# Patient Record
Sex: MALE | Race: WHITE | NOT HISPANIC OR LATINO | Employment: STUDENT | URBAN - METROPOLITAN AREA
[De-identification: names, ages, dates, MRNs, and addresses within clinical notes are randomized per-mention and may not be internally consistent; named-entity substitution may affect disease eponyms.]

---
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
## 2023-09-18 ENCOUNTER — OFFICE VISIT (OUTPATIENT)
Dept: URGENT CARE | Facility: CLINIC | Age: 18
End: 2023-09-18
Payer: COMMERCIAL

## 2023-09-18 VITALS
TEMPERATURE: 98.1 F | WEIGHT: 305 LBS | HEART RATE: 69 BPM | RESPIRATION RATE: 12 BRPM | BODY MASS INDEX: 39.16 KG/M2 | OXYGEN SATURATION: 98 %

## 2023-09-18 DIAGNOSIS — M54.6 ACUTE MIDLINE THORACIC BACK PAIN: ICD-10-CM

## 2023-09-18 DIAGNOSIS — M54.2 NECK PAIN: Primary | ICD-10-CM

## 2023-09-18 PROCEDURE — 99213 OFFICE O/P EST LOW 20 MIN: CPT | Performed by: PHYSICIAN ASSISTANT

## 2023-09-18 RX ORDER — PREDNISONE 10 MG/1
30 TABLET ORAL DAILY
Qty: 12 TABLET | Refills: 0 | Status: SHIPPED | OUTPATIENT
Start: 2023-09-18 | End: 2023-09-22

## 2023-09-18 RX ORDER — NAPROXEN 500 MG/1
500 TABLET ORAL 2 TIMES DAILY WITH MEALS
Qty: 20 TABLET | Refills: 0 | Status: SHIPPED | OUTPATIENT
Start: 2023-09-18 | End: 2023-09-28

## 2023-09-18 NOTE — PROGRESS NOTES
[facility]        NAME: Edward Kelsey is a 19 y.o. male  : 2005    MRN: [id number]  DATE: 2023  TIME: 10:10 AM    Assessment and Plan   Neck pain [M54.2]  1. Neck pain  Ambulatory referral to Orthopedic Surgery    Ambulatory Referral to Rheumatology    predniSONE 10 mg tablet    naproxen (Naprosyn) 500 mg tablet      2. Acute midline thoracic back pain              Patient Instructions   Patient Instructions   Take the steroid and when completed begin the naproxen. Follow-up with orthopedics. Follow up with PCP in 3-5 days. Proceed to  ER if symptoms worsen. Chief Complaint     Chief Complaint   Patient presents with   • Spasms     Pt presents with right sided palm pain that radiates up right arm, knuckle stiffness on the right hand, started to worsen this morning; states right side back pain below scapula  started last week          History of Present Illness       BISI BAILEY Is a 55-year-old male presenting with pain in the right thumb, right wrist, lateral right forearm, cervical neck pain, and back pain x a few months. Patient says that the back pain and neck pain is relieved with a back massager and that the pain all over is worse with daily activity. Patient describes the pain as crampy and stiff. Patient says that the pain in the hand is on the dorsal aspect of the thumb and the knuckles, and is having difficulty with extension of the right wrist as well. Patient says his  strength is also weakened in the right hand when asked to do flexion of the fingers. Patient's family history is positive for rheumatoid arthritis and carpal tunnel. Patient reports being right-handed. He reports feeling decreased  strength in the right hand. Occasionally the right shoulder pain is described as a burning pain. He admits when using his right hand a lot he will feel paresthesias in the right thumb.   Admits that this got worse when he took on a bigger role at work.                Review of Systems   Review of Systems   Constitutional: Negative for activity change, appetite change, chills, diaphoresis and fever. HENT: Negative for congestion. Respiratory: Negative for cough, chest tightness and shortness of breath. Cardiovascular: Negative for chest pain and palpitations. Gastrointestinal: Negative for abdominal pain, diarrhea, nausea and vomiting. Musculoskeletal: Positive for arthralgias and back pain. Negative for joint swelling and myalgias. Skin: Negative for color change and pallor. Neurological: Negative for headaches.          Current Medications       Current Outpatient Medications:   •  albuterol (ProAir HFA) 90 mcg/act inhaler, Inhale 2 puffs every 4 (four) hours as needed for wheezing or shortness of breath, Disp: 8.5 g, Rfl: 9  •  FLUoxetine (PROzac) 20 mg capsule, , Disp: , Rfl:   •  naproxen (Naprosyn) 500 mg tablet, Take 1 tablet (500 mg total) by mouth 2 (two) times a day with meals for 10 days, Disp: 20 tablet, Rfl: 0  •  predniSONE 10 mg tablet, Take 3 tablets (30 mg total) by mouth daily for 4 days, Disp: 12 tablet, Rfl: 0  •  fluticasone (FLONASE) 50 mcg/act nasal spray, 1 spray into each nostril daily (Patient not taking: Reported on 9/18/2023), Disp: 18.2 mL, Rfl: 0  •  methylPREDNISolone 4 MG tablet therapy pack, Use as directed on package (Patient not taking: Reported on 9/18/2023), Disp: 21 tablet, Rfl: 0    Current Allergies     Allergies as of 09/18/2023 - Reviewed 09/18/2023   Allergen Reaction Noted   • Acetaminophen Other (See Comments) 04/28/2020            The following portions of the patient's history were reviewed and updated as appropriate: allergies, current medications, past family history, past medical history, past social history, past surgical history and problem list.     Past Medical History:   Diagnosis Date   • Allergic rhinitis    • Asthma    • Pneumonia    • Psychiatric disorder        Past Surgical History: Procedure Laterality Date   • ADENOIDECTOMY     • BONE TUMOR EXCISION Left     knee   • TONSILLECTOMY         Family History   Problem Relation Age of Onset   • Heart disease Mother    • Diabetes Mother          Medications have been verified. Objective   Pulse 69   Temp 98.1 °F (36.7 °C)   Resp 12   Wt (!) 138 kg (305 lb)   SpO2 98%   BMI 39.16 kg/m²        Physical Exam     Physical Exam  Vitals and nursing note reviewed. Constitutional:       General: He is not in acute distress. Appearance: Normal appearance. He is obese. He is not ill-appearing, toxic-appearing or diaphoretic. Cardiovascular:      Rate and Rhythm: Normal rate and regular rhythm. Heart sounds: No murmur heard. No friction rub. No gallop. Pulmonary:      Effort: Pulmonary effort is normal. No respiratory distress. Breath sounds: Normal breath sounds. No stridor. No wheezing or rhonchi. Musculoskeletal:         General: Tenderness (Tender to light palpation over the entire spine, both sides of the neck and entire right arm ) present. Normal range of motion. Comments: Lateral epicondylitis special test was performed and patient expressed pain with resistance to forced flexion while patient was in extension of the right hand. Phalen test was performed of the right hand and patient was unable to hold for 1 minute complaining of pain in the wrist of the right hand. Neck pain with ROM of the R shoulder    Skin:     General: Skin is warm.

## 2023-09-18 NOTE — LETTER
September 18, 2023     Patient: Sari Curry  YOB: 2005  Date of Visit: 9/18/2023      To Whom it May Concern:    Ramon Juarez is under my professional care. Jesi Johnson was seen in my office on 9/18/2023. Jesi Johnson . If you have any questions or concerns, please don't hesitate to call.          Sincerely,          Melissa Hodge PA-C        CC: No Recipients

## 2023-09-18 NOTE — LETTER
September 18, 2023     Patient: Davon Albert  YOB: 2005  Date of Visit: 9/18/2023      To Whom it May Concern:    Kiki Alexandra is under my professional care. Isaac Kahn was seen in my office on 9/18/2023. Isaac Kahn may return to work on 9/20/23 . If you have any questions or concerns, please don't hesitate to call.          Sincerely,          Nirali Desai PA-C        CC: No Recipients

## 2023-09-28 ENCOUNTER — OFFICE VISIT (OUTPATIENT)
Dept: OBGYN CLINIC | Facility: CLINIC | Age: 18
End: 2023-09-28
Payer: COMMERCIAL

## 2023-09-28 ENCOUNTER — APPOINTMENT (OUTPATIENT)
Dept: RADIOLOGY | Facility: CLINIC | Age: 18
End: 2023-09-28
Payer: COMMERCIAL

## 2023-09-28 VITALS
HEART RATE: 71 BPM | SYSTOLIC BLOOD PRESSURE: 140 MMHG | WEIGHT: 305 LBS | HEIGHT: 74 IN | BODY MASS INDEX: 39.14 KG/M2 | DIASTOLIC BLOOD PRESSURE: 84 MMHG

## 2023-09-28 DIAGNOSIS — M54.2 NECK PAIN ON RIGHT SIDE: Primary | ICD-10-CM

## 2023-09-28 DIAGNOSIS — G56.01 CARPAL TUNNEL SYNDROME ON RIGHT: ICD-10-CM

## 2023-09-28 DIAGNOSIS — M54.2 NECK PAIN: ICD-10-CM

## 2023-09-28 PROCEDURE — 99204 OFFICE O/P NEW MOD 45 MIN: CPT | Performed by: ORTHOPAEDIC SURGERY

## 2023-09-28 PROCEDURE — 72040 X-RAY EXAM NECK SPINE 2-3 VW: CPT

## 2023-09-28 NOTE — PROGRESS NOTES
Assessment/Plan:  1. Neck pain on right side  XR spine cervical 2 or 3 vw injury    Ambulatory referral to Physical Therapy      2. Carpal tunnel syndrome on right  Jose Sky has right-sided neck and shoulder pain which appears to be a trapezius muscle spasm. He may have an element of cervical radiculopathy but I do think muscle spasm in the cervical and trapezius region makes more sense. He appears to have numbness and tingling and weakness in the right hand which is more attributed to carpal tunnel syndrome. I recommended night splinting with a cock up wrist splint as well as physical therapy focusing on the neck. If the symptoms seem to be more related to cervical radiculopathy we could consider further imaging of the cervical spineor further work-up for carpal tunnel syndrome in the future. Follow-up in 6 weeks for repeat evaluation. Subjective:   Edward Kelsey is a 19 y.o. male who presents to the office for evaluation for right-sided neck pain rating into his right hand. He denies any recent injury or trauma. He has been having increased discomfort in the right side of his neck for the last few months. He does work and plays trumpet. He has been feeling increased pain in the neck and shoulder region and numbness into his hand at times. He does state that the numbness seems to increase at night and he wakes up with a numb feeling in his hand. He does report some weakness with  strength regarding the right hand as well. He went to urgent care recently and was placed on oral steroids and anti-inflammatory medications. He states this has not significantly helped. Review of Systems   Constitutional: Negative for chills, fever and unexpected weight change. HENT: Negative for hearing loss, nosebleeds and sore throat. Eyes: Negative for pain, redness and visual disturbance. Respiratory: Negative for cough, shortness of breath and wheezing.     Cardiovascular: Negative for chest pain, palpitations and leg swelling. Gastrointestinal: Negative for abdominal pain, nausea and vomiting. Endocrine: Negative for polyphagia and polyuria. Genitourinary: Negative for dysuria and hematuria. Musculoskeletal:        See HPI   Skin: Negative for rash and wound. Neurological: Negative for dizziness, numbness and headaches. Psychiatric/Behavioral: Negative for decreased concentration and suicidal ideas. The patient is not nervous/anxious.           Past Medical History:   Diagnosis Date   • Allergic rhinitis    • Asthma    • Pneumonia    • Psychiatric disorder        Past Surgical History:   Procedure Laterality Date   • ADENOIDECTOMY     • BONE TUMOR EXCISION Left     knee   • TONSILLECTOMY         Family History   Problem Relation Age of Onset   • Heart disease Mother    • Diabetes Mother        Social History     Occupational History   • Not on file   Tobacco Use   • Smoking status: Never     Passive exposure: Current   • Smokeless tobacco: Never   Vaping Use   • Vaping Use: Every day   • Substances: Nicotine, THC, CBD, Flavoring   Substance and Sexual Activity   • Alcohol use: Never   • Drug use: Yes     Types: Marijuana     Comment: states every other day   • Sexual activity: Not Currently         Current Outpatient Medications:   •  albuterol (ProAir HFA) 90 mcg/act inhaler, Inhale 2 puffs every 4 (four) hours as needed for wheezing or shortness of breath, Disp: 8.5 g, Rfl: 9  •  FLUoxetine (PROzac) 20 mg capsule, , Disp: , Rfl:   •  fluticasone (FLONASE) 50 mcg/act nasal spray, 1 spray into each nostril daily (Patient not taking: Reported on 9/18/2023), Disp: 18.2 mL, Rfl: 0  •  methylPREDNISolone 4 MG tablet therapy pack, Use as directed on package (Patient not taking: Reported on 9/18/2023), Disp: 21 tablet, Rfl: 0  •  naproxen (Naprosyn) 500 mg tablet, Take 1 tablet (500 mg total) by mouth 2 (two) times a day with meals for 10 days, Disp: 20 tablet, Rfl: 0    Allergies   Allergen Reactions   • Acetaminophen Other (See Comments)     Contraindicated due to fatty liver  Other reaction(s): Other (See Comments)  Contraindicated due to fatty liver  Contraindicated due to fatty liver       Objective:  Vitals:    09/28/23 1334   BP: 140/84   Pulse: 71            Right Hand Exam     Tenderness   Right hand tenderness location: Tenderness over carpal tunnel. Range of Motion   Wrist   Extension: normal   Flexion: normal   Pronation: normal   Supination: normal     Muscle Strength   Wrist extension: 5/5   Wrist flexion: 5/5   : 4/5     Tests   Phalen’s sign: positive  Tinel's sign (median nerve): positive    Other   Erythema: absent  Sensation: normal  Pulse: present    Comments:  Positive Durkan's test  Weakness with thumb opposition          Strength/Myotome Testing     Right Wrist/Hand   Wrist extension: 5  Wrist flexion: 5    Tests     Right Wrist/Hand   Positive Phalen's sign and Tinel's sign (medial nerve). Physical Exam  Vitals and nursing note reviewed. Constitutional:       Appearance: Normal appearance. He is well-developed. HENT:      Head: Normocephalic and atraumatic. Right Ear: External ear normal.      Left Ear: External ear normal.      Nose: Nose normal.   Eyes:      General: No scleral icterus. Extraocular Movements: Extraocular movements intact. Conjunctiva/sclera: Conjunctivae normal.   Cardiovascular:      Rate and Rhythm: Normal rate. Pulmonary:      Effort: Pulmonary effort is normal. No respiratory distress. Musculoskeletal:      Cervical back: Normal range of motion and neck supple. Comments: See Ortho exam   Skin:     General: Skin is warm and dry. Neurological:      General: No focal deficit present. Mental Status: He is alert and oriented to person, place, and time.    Psychiatric:         Behavior: Behavior normal.         I have personally reviewed pertinent films in PACS and my interpretation is as follows:  X-rays of the cervical spine demonstrate no acute abnormality. Loss of normal cervical lordosis indicative of muscle spasm. This document was created using speech voice recognition software. Grammatical errors, random word insertions, pronoun errors, and incomplete sentences are an occasional consequence of this system due to software limitations, ambient noise, and hardware issues. Any formal questions or concerns about content, text, or information contained within the body of this dictation should be directly addressed to the provider for clarification.

## 2023-10-30 ENCOUNTER — OFFICE VISIT (OUTPATIENT)
Dept: URGENT CARE | Facility: CLINIC | Age: 18
End: 2023-10-30
Payer: COMMERCIAL

## 2023-10-30 VITALS
HEART RATE: 78 BPM | BODY MASS INDEX: 40.17 KG/M2 | RESPIRATION RATE: 20 BRPM | OXYGEN SATURATION: 99 % | HEIGHT: 74 IN | TEMPERATURE: 97 F | WEIGHT: 313 LBS

## 2023-10-30 DIAGNOSIS — B34.9 VIRAL INFECTION: Primary | ICD-10-CM

## 2023-10-30 PROCEDURE — 87636 SARSCOV2 & INF A&B AMP PRB: CPT | Performed by: PHYSICIAN ASSISTANT

## 2023-10-30 PROCEDURE — 99203 OFFICE O/P NEW LOW 30 MIN: CPT | Performed by: PHYSICIAN ASSISTANT

## 2023-10-30 NOTE — PROGRESS NOTES
[facility]        NAME: Virginia Rangel is a 19 y.o. male  : 2005    MRN: [id number]  DATE: 2023  TIME: 9:19 AM    Assessment and Plan   Viral infection [B34.9]  1. Viral infection  Covid19 and INFLUENZA A/B PCR        Stay hydrated. May use pepto for symptomatic relief. Discussed strict return to care precautions as well as red flag symptoms which should prompt immediate ED referral. Pt verbalized understanding and is in agreement with plan. Please follow up with your primary care provider within the next week. Please remember that your visit today was with an urgent care provider and should not replace follow up with your primary care provider for chronic medical issues or annual physicals. (Directly from [facility] website)  IF YOU TEST POSITIVE FOR COVID-19:  If you have symptoms, you can end isolation after 5 full days if you are fever-free for 24 hours without the use of fever-reducing medication and your other symptoms have improved. Loss of taste and/or smell may persist for weeks or more and should not delay the end of isolation. Your first day of symptoms is DAY ZERO. You should continue to wear a well-fitting mask (like N95, [id number]) both at home and in public for 5 additional days. Avoid people who are at high risk for severe disease for at least 10 days. DO NOT go to places where you are unable to wear a mask until a full 10 days from your first symptom. If you have no symptoms, quarantine for 5 days from the day you were tested. The day you were tested is DAY ZERO. Continue wearing a mask around others until day 10. If you develop symptoms, your 5-day isolation period starts over. If you have/had severe symptoms and/or a compromised immune system, you may need to isolate longer. Consult with your primary care provider about when you can resume being around other people.     IF YOU HAVE HAD CLOSE EXPOSURE:  QUARANTINE IF: You are ages 25 or older and either have not been vaccinated, or have been vaccinated with your primary series but not the booster. You must quarantine for at least 5 days after your last contact. If you develop symptoms, get tested immediately. If you do not develop symptoms, get tested at least 5 days after your last exposure. Avoid people who are immunocompromised at high risk for severe disease until at least after 10 days. YOU DO NOT HAVE TO QUARANTINE IF:   You are 18 years or older and have received all recommended vaccine doses, including boosters and additional primary shots for some immunocompromised people. You are ages 6-13 and completed the primary series of COVID-19 vaccines. You had confirmed COVID-19 within the last 90 days on a viral test.  You should still wear a well-fitting mask around others for 10 days from the date of your last close exposure to COVID-19, and get tested at least 5 days later. Quarantine and isolation guidelines differ for healthcare professionals. Patient Instructions       Follow up with PCP in 3-5 days. Proceed to  ER if symptoms worsen. Chief Complaint     Chief Complaint   Patient presents with    URI      URI asthma attacks since Saturday         History of Present Illness       Renita Escobar is a(n) 19 y.o. male presenting with URI symptoms x 2 days  Past medical history: asthma, seasonal allergies  Congestion: yes  Sore throat: yes  Cough: yes  Sputum production: no  Fever: yes, subjective only  Body aches: yes  Loss of smell/taste: no  GI symptoms: yes diarrhea, nausea, and generalized abd cramping  Known sick contacts: yes, whole house has a "bug"  OTC meds tried: tylenol  Has been using his inhaler and does feel better today. Needs work note. Review of Systems   Review of Systems   Constitutional:         Negative except as noted in HPI   Respiratory:  Negative for shortness of breath. Cardiovascular:  Negative for chest pain.          Current Medications       Current Outpatient Medications:     albuterol (ProAir HFA) 90 mcg/act inhaler, Inhale 2 puffs every 4 (four) hours as needed for wheezing or shortness of breath, Disp: 8.5 g, Rfl: 9    FLUoxetine (PROzac) 20 mg capsule, , Disp: , Rfl:     fluticasone (FLONASE) 50 mcg/act nasal spray, 1 spray into each nostril daily (Patient not taking: Reported on 9/18/2023), Disp: 18.2 mL, Rfl: 0    methylPREDNISolone 4 MG tablet therapy pack, Use as directed on package (Patient not taking: Reported on 9/18/2023), Disp: 21 tablet, Rfl: 0    naproxen (Naprosyn) 500 mg tablet, Take 1 tablet (500 mg total) by mouth 2 (two) times a day with meals for 10 days, Disp: 20 tablet, Rfl: 0    Current Allergies     Allergies as of 10/30/2023 - Reviewed 10/30/2023   Allergen Reaction Noted    Acetaminophen Other (See Comments) 04/28/2020            The following portions of the patient's history were reviewed and updated as appropriate: allergies, current medications, past family history, past medical history, past social history, past surgical history and problem list.     Past Medical History:   Diagnosis Date    Allergic rhinitis     Asthma     Pneumonia     Psychiatric disorder        Past Surgical History:   Procedure Laterality Date    ADENOIDECTOMY      BONE TUMOR EXCISION Left     knee    TONSILLECTOMY         Family History   Problem Relation Age of Onset    Heart disease Mother     Diabetes Mother          Medications have been verified. Objective   Pulse 78   Temp (!) 97 °F (36.1 °C)   Resp 20   Ht 6' 2" (1.88 m)   Wt (!) 142 kg (313 lb)   SpO2 99%   BMI 40.19 kg/m²        Physical Exam     Physical Exam  Vitals and nursing note reviewed. Constitutional:       General: He is not in acute distress. Appearance: Normal appearance. He is not toxic-appearing. HENT:      Head: Normocephalic and atraumatic.       Right Ear: Tympanic membrane, ear canal and external ear normal.      Left Ear: Tympanic membrane, ear canal and external ear normal.      Nose: No congestion. Mouth/Throat:      Mouth: Mucous membranes are moist.      Pharynx: Oropharynx is clear. No oropharyngeal exudate or posterior oropharyngeal erythema. Eyes:      Conjunctiva/sclera: Conjunctivae normal.      Pupils: Pupils are equal, round, and reactive to light. Cardiovascular:      Rate and Rhythm: Normal rate and regular rhythm. Heart sounds: Normal heart sounds. Pulmonary:      Effort: Pulmonary effort is normal. No respiratory distress. Breath sounds: Normal breath sounds. No wheezing, rhonchi or rales. Abdominal:      General: Abdomen is flat. Bowel sounds are normal. There is no distension. Palpations: Abdomen is soft. Tenderness: There is generalized abdominal tenderness. There is no right CVA tenderness, left CVA tenderness, guarding or rebound. Negative signs include Colmenares's sign, Rovsing's sign and McBurney's sign. Musculoskeletal:      Cervical back: Normal range of motion and neck supple. Skin:     General: Skin is warm and dry. Capillary Refill: Capillary refill takes less than 2 seconds. Neurological:      Mental Status: He is alert and oriented to person, place, and time.    Psychiatric:         Behavior: Behavior normal.

## 2023-10-30 NOTE — LETTER
[facility] Imelda Morales  [place] [id number]  [phone]  Dept: [phone]    October 30, 2023    Patient: Lb Jordan  YOB: 2005    Lb Jordan was seen and evaluated at our [facility]. Please note if Covid and Flu tests are negative, they may return to work when fever free for 24 hours without the use of a fever reducing agent. If Covid or Flu test is positive, they may return to work on 11/2/2023, as this is 5 days from the onset of symptoms. Upon return, they must then adhere to strict masking for an additional 5 days.     Sincerely,    Milady Breaux PA-C

## 2023-10-31 LAB
FLUAV RNA RESP QL NAA+PROBE: NEGATIVE
FLUBV RNA RESP QL NAA+PROBE: NEGATIVE
SARS-COV-2 RNA RESP QL NAA+PROBE: NEGATIVE

## 2024-11-22 ENCOUNTER — OFFICE VISIT (OUTPATIENT)
Dept: URGENT CARE | Facility: CLINIC | Age: 19
End: 2024-11-22
Payer: COMMERCIAL

## 2024-11-22 VITALS
TEMPERATURE: 96.6 F | DIASTOLIC BLOOD PRESSURE: 85 MMHG | RESPIRATION RATE: 20 BRPM | OXYGEN SATURATION: 96 % | WEIGHT: 300 LBS | BODY MASS INDEX: 38.52 KG/M2 | HEART RATE: 90 BPM | SYSTOLIC BLOOD PRESSURE: 130 MMHG

## 2024-11-22 DIAGNOSIS — J02.9 SORE THROAT: ICD-10-CM

## 2024-11-22 DIAGNOSIS — J01.00 ACUTE NON-RECURRENT MAXILLARY SINUSITIS: Primary | ICD-10-CM

## 2024-11-22 LAB — S PYO AG THROAT QL: NEGATIVE

## 2024-11-22 PROCEDURE — 87880 STREP A ASSAY W/OPTIC: CPT

## 2024-11-22 PROCEDURE — 99213 OFFICE O/P EST LOW 20 MIN: CPT

## 2024-11-22 RX ORDER — PREDNISONE 20 MG/1
40 TABLET ORAL DAILY
Qty: 10 TABLET | Refills: 0 | Status: SHIPPED | OUTPATIENT
Start: 2024-11-22 | End: 2024-11-27

## 2024-11-22 RX ORDER — ALBUTEROL SULFATE 90 UG/1
2 INHALANT RESPIRATORY (INHALATION) EVERY 6 HOURS PRN
Qty: 8.5 G | Refills: 0 | Status: SHIPPED | OUTPATIENT
Start: 2024-11-22

## 2024-11-22 NOTE — PROGRESS NOTES
[facility]        NAME: Quique Kim is a 19 y.o. male  : 2005    MRN: [id number]  DATE: 2024  TIME: 2:11 PM    Assessment and Plan   Acute non-recurrent maxillary sinusitis [J01.00]  1. Acute non-recurrent maxillary sinusitis  amoxicillin-clavulanate (AUGMENTIN) 875-125 mg per tablet    predniSONE 20 mg tablet    albuterol (ProAir HFA) 90 mcg/act inhaler      2. Sore throat  POCT rapid ANTIGEN strepA        Rapid strep negative in office.     Symptoms likely due to sinusitis. Due to fever and severity of symptoms, will treat.     Patient Instructions     Supportive care with rest, adequate hydration, and warm liquids   Antibiotics as prescribed  Over the counter Tylenol/acetaminophen as needed for fever  Over the counter cold/cough medicine as needed    Follow up with PCP in 3-5 days   Go to ER if worsening symptoms      If tests are performed, our office will contact you with results only if changes need to made to the care plan discussed with you at the visit. [facility]    Chief Complaint     Chief Complaint   Patient presents with    Sore Throat     Reports sore throat, facial pain, mucus, headaches, lightheadedness, fatigue x 4 days. States he had a fever the first few days that resolved. Reports friend was recently unwell with similar symptoms. Using Mucinex-D that is helpful for congestion.          History of Present Illness       Friend recently sick with similar symptoms. Patient has history of asthma.    Sinusitis  This is a new problem. The current episode started in the past 7 days. The problem is unchanged. The maximum temperature recorded prior to his arrival was 100.4 - 100.9 F. The fever has been present for 1 to 2 days. The pain is moderate. Associated symptoms include congestion, coughing (productive), headaches, sinus pressure and a sore throat. Pertinent negatives include no chills or shortness of breath.  Treatments tried: Mucenex-D. The treatment provided moderate relief.       Review of Systems   Review of Systems   Constitutional:  Positive for fatigue and fever (now resolved). Negative for chills.   HENT:  Positive for congestion, postnasal drip, sinus pressure and sore throat. Negative for rhinorrhea and trouble swallowing.    Respiratory:  Positive for cough (productive) and chest tightness. Negative for shortness of breath and wheezing.    Cardiovascular:  Negative for chest pain and palpitations.   Gastrointestinal:  Negative for abdominal pain, nausea and vomiting.   Genitourinary:  Negative for difficulty urinating.   Musculoskeletal:  Negative for myalgias.   Neurological:  Positive for light-headedness and headaches. Negative for dizziness.         Current Medications       Current Outpatient Medications:     albuterol (ProAir HFA) 90 mcg/act inhaler, Inhale 2 puffs every 4 (four) hours as needed for wheezing or shortness of breath, Disp: 8.5 g, Rfl: 9    albuterol (ProAir HFA) 90 mcg/act inhaler, Inhale 2 puffs every 6 (six) hours as needed for wheezing, Disp: 8.5 g, Rfl: 0    amoxicillin-clavulanate (AUGMENTIN) 875-125 mg per tablet, Take 1 tablet by mouth every 12 (twelve) hours for 7 days, Disp: 14 tablet, Rfl: 0    fluticasone (FLONASE) 50 mcg/act nasal spray, 1 spray into each nostril daily, Disp: 18.2 mL, Rfl: 0    naproxen (Naprosyn) 500 mg tablet, Take 1 tablet (500 mg total) by mouth 2 (two) times a day with meals for 10 days (Patient taking differently: Take 1 tablet (500 mg total) by mouth 2 (two) times a day with meals for 10 days), Disp: 20 tablet, Rfl: 0    predniSONE 20 mg tablet, Take 2 tablets (40 mg total) by mouth daily for 5 days, Disp: 10 tablet, Rfl: 0    FLUoxetine (PROzac) 20 mg capsule, , Disp: , Rfl:     methylPREDNISolone 4 MG tablet therapy pack, Use as directed on package (Patient not taking: Reported on 11/22/2024), Disp: 21 tablet, Rfl: 0    Current Allergies     Allergies  as of 11/22/2024 - Reviewed 11/22/2024   Allergen Reaction Noted    Acetaminophen Other (See Comments) 04/28/2020            The following portions of the patient's history were reviewed and updated as appropriate: allergies, current medications, past family history, past medical history, past social history, past surgical history and problem list.     Past Medical History:   Diagnosis Date    Allergic rhinitis     Asthma     Pneumonia     Psychiatric disorder        Past Surgical History:   Procedure Laterality Date    ADENOIDECTOMY      BONE TUMOR EXCISION Left     knee    TONSILLECTOMY         Family History   Problem Relation Age of Onset    Heart disease Mother     Diabetes Mother          Medications have been verified.        Objective   /85   Pulse 90   Temp (!) 96.6 °F (35.9 °C) (Tympanic)   Resp 20   Wt 136 kg (300 lb)   SpO2 96%   BMI 38.52 kg/m²        Physical Exam     Physical Exam  Constitutional:       General: He is not in acute distress.     Appearance: He is ill-appearing.   HENT:      Nose: Congestion present.      Right Sinus: Maxillary sinus tenderness present. No frontal sinus tenderness.      Left Sinus: Maxillary sinus tenderness present. No frontal sinus tenderness.      Mouth/Throat:      Mouth: Mucous membranes are moist.      Pharynx: Oropharynx is clear. No posterior oropharyngeal erythema.      Tonsils: No tonsillar exudate. 1+ on the right. 1+ on the left.   Eyes:      Pupils: Pupils are equal, round, and reactive to light.   Cardiovascular:      Rate and Rhythm: Normal rate and regular rhythm.      Pulses: Normal pulses.      Heart sounds: Normal heart sounds. No murmur heard.     No gallop.   Pulmonary:      Effort: Pulmonary effort is normal. No respiratory distress.      Breath sounds: Normal breath sounds. No wheezing, rhonchi or rales.      Comments: Deep cough noted on exam  Abdominal:      General: Abdomen is flat. Bowel sounds are normal. There is no distension.       Palpations: Abdomen is soft.      Tenderness: There is no abdominal tenderness.   Musculoskeletal:         General: Normal range of motion.      Cervical back: Normal range of motion.   Skin:     General: Skin is warm and dry.      Capillary Refill: Capillary refill takes less than 2 seconds.   Neurological:      Mental Status: He is alert and oriented to person, place, and time.

## 2024-11-22 NOTE — LETTER
November 22, 2024     Patient: Quique Kim   YOB: 2005   Date of Visit: 11/22/2024       To Whom it May Concern:    Quique Kim was seen in my clinic on 11/22/2024. He may return to work on 11/25/2024 .    If you have any questions or concerns, please don't hesitate to call.         Sincerely,          Moni Daniels PA-C        CC: No Recipients

## 2024-11-22 NOTE — PATIENT INSTRUCTIONS
Supportive care with rest, adequate hydration, and warm liquids   Antibiotics as prescribed  Over the counter Tylenol/acetaminophen as needed for fever  Over the counter cold/cough medicine as needed    Follow up with PCP in 3-5 days   Go to ER if worsening symptoms

## 2025-03-04 NOTE — ED NOTES
Side rail up x 1  Mother at bedside  Call bell/remote within reach       Madi Cooney RN  10/24/17 [id number] MSK XR negative - No fracture, No dislocation, No foreign body

## 2025-03-28 ENCOUNTER — HOSPITAL ENCOUNTER (EMERGENCY)
Facility: HOSPITAL | Age: 20
Discharge: HOME | End: 2025-03-28
Attending: EMERGENCY MEDICINE
Payer: COMMERCIAL

## 2025-03-28 VITALS
BODY MASS INDEX: 38.31 KG/M2 | WEIGHT: 298.4 LBS | HEART RATE: 82 BPM | TEMPERATURE: 98.3 F | SYSTOLIC BLOOD PRESSURE: 146 MMHG | OXYGEN SATURATION: 100 % | DIASTOLIC BLOOD PRESSURE: 76 MMHG | RESPIRATION RATE: 18 BRPM

## 2025-03-28 DIAGNOSIS — R22.0 LIP SWELLING: Primary | ICD-10-CM

## 2025-03-28 PROCEDURE — 99282 EMERGENCY DEPT VISIT SF MDM: CPT

## 2025-03-28 PROCEDURE — 99284 EMERGENCY DEPT VISIT MOD MDM: CPT | Performed by: EMERGENCY MEDICINE

## 2025-03-28 RX ORDER — IBUPROFEN 400 MG/1
400 TABLET, FILM COATED ORAL ONCE
Status: COMPLETED | OUTPATIENT
Start: 2025-03-28 | End: 2025-03-28

## 2025-03-28 RX ORDER — ACETAMINOPHEN 325 MG/1
975 TABLET ORAL ONCE
Status: COMPLETED | OUTPATIENT
Start: 2025-03-28 | End: 2025-03-28

## 2025-03-28 RX ADMIN — ACETAMINOPHEN 975 MG: 325 TABLET ORAL at 05:07

## 2025-03-28 RX ADMIN — BENZOCAINE 1 APPLICATION: 220 GEL, DENTIFRICE DENTAL at 05:07

## 2025-03-28 RX ADMIN — AMOXICILLIN AND CLAVULANATE POTASSIUM 1 TABLET: 875; 125 TABLET, FILM COATED ORAL at 05:07

## 2025-03-28 RX ADMIN — IBUPROFEN 400 MG: 400 TABLET, FILM COATED ORAL at 05:07

## 2025-03-28 NOTE — ED PROVIDER NOTES
Time reflects when diagnosis was documented in both MDM as applicable and the Disposition within this note       Time User Action Codes Description Comment    3/28/2025  4:49 AM Violet Betancourt Add [R22.0] Lip swelling           ED Disposition       ED Disposition   Discharge    Condition   Stable    Date/Time   Fri Mar 28, 2025  4:49 AM    Comment   Quique BOB Kim discharge to home/self care.                   Assessment & Plan       Medical Decision Making  Pt is a 20yo M who presents with lip swelling.    Appears localized, no evidence of angioedema. Potential cold sore vs canker sore vs abscess. As pt has previously improved with abx, will order. Will also treat symptomatically.     Plan to discharge pt with f/u to PCP. Discussed returning the ED with new or worsening of symptoms. Discussed use of over the counter medications as stated on the bottle as needed for pain. Discussed taking new medication as prescribed and to completion. Pt expressed understanding of discharge instructions, return precautions, and medication instructions and is stable for discharge at this time. All questions were answered and pt was discharged without incident.           Risk  OTC drugs.  Prescription drug management.             Medications   amoxicillin-clavulanate (AUGMENTIN) 875-125 mg per tablet 1 tablet (1 tablet Oral Given 3/28/25 0507)   acetaminophen (TYLENOL) tablet 975 mg (975 mg Oral Given 3/28/25 0507)   ibuprofen (MOTRIN) tablet 400 mg (400 mg Oral Given 3/28/25 0507)   BENZOCAINE (DENTAL) 20 % swab 1 Application (1 Application Oral Given 3/28/25 0507)       ED Risk Strat Scores                            SBIRT 22yo+      Flowsheet Row Most Recent Value   Initial Alcohol Screen: US AUDIT-C     1. How often do you have a drink containing alcohol? 0 Filed at: 03/28/2025 0439   2. How many drinks containing alcohol do you have on a typical day you are drinking?  0 Filed at: 03/28/2025 0439   3a. Male UNDER 65: How  "often do you have five or more drinks on one occasion? 0 Filed at: 03/28/2025 0439   Audit-C Score 0 Filed at: 03/28/2025 0439   JOSEPH: How many times in the past year have you...    Used an illegal drug or used a prescription medication for non-medical reasons? Never Filed at: 03/28/2025 0439                            History of Present Illness       Chief Complaint   Patient presents with    Lip Swelling     Pt reports previously having upper respiratory cold and fever and today newly developed swelling on lower lip.         Past Medical History:   Diagnosis Date    Allergic rhinitis     Asthma     Pneumonia     Psychiatric disorder       Past Surgical History:   Procedure Laterality Date    ADENOIDECTOMY      BONE TUMOR EXCISION Left     knee    TONSILLECTOMY        Family History   Problem Relation Age of Onset    Heart disease Mother     Diabetes Mother       Social History     Tobacco Use    Smoking status: Never     Passive exposure: Current    Smokeless tobacco: Never   Vaping Use    Vaping status: Every Day    Substances: Nicotine, THC, CBD, Flavoring   Substance Use Topics    Alcohol use: Never    Drug use: Yes     Types: Marijuana     Comment: states every other day      E-Cigarette/Vaping    E-Cigarette Use Current Every Day User       E-Cigarette/Vaping Substances    Nicotine Yes     THC Yes     CBD Yes     Flavoring Yes     Other No     Unknown No       I have reviewed and agree with the history as documented.     Pt is a 18yo M who presents for lip swelling.  Patient reports that yesterday he noted swelling to the right side of his lower lip.  Patient states he accidentally \"bit into it\" and had some blood and drainage.  Patient reports today the swelling and pain were worse and he therefore came in for further evaluation.  Patient reports he has had this twice previously and it has responded to antibiotics.  Per chart review, patient last seen in 2021 for similar and received Augmentin.  Patient " reports he placed the trumpet and often gets swelling or sores on his lips secondary to this.  Patient reports he has been using salt water, ice, and medicated mouthwash without improvement prior to arrival.  Patient does report URI symptoms earlier in the week but denies any other symptoms or complaints.        Objective       ED Triage Vitals   Temperature Pulse Blood Pressure Respirations SpO2 Patient Position - Orthostatic VS   03/28/25 0432 03/28/25 0432 03/28/25 0432 03/28/25 0432 03/28/25 0432 03/28/25 0432   98.3 °F (36.8 °C) 82 146/76 18 100 % Sitting      Temp Source Heart Rate Source BP Location FiO2 (%) Pain Score    03/28/25 0432 -- 03/28/25 0432 -- 03/28/25 0507    Oral  Left arm  5      Vitals      Date and Time Temp Pulse SpO2 Resp BP Pain Score FACES Pain Rating User   03/28/25 0507 -- -- -- -- -- 5 -- DD   03/28/25 0432 98.3 °F (36.8 °C) 82 100 % 18 146/76 -- -- SW            Physical Exam  Vitals reviewed.   Constitutional:       General: He is not in acute distress.     Appearance: He is well-developed. He is not toxic-appearing or diaphoretic.   HENT:      Head: Normocephalic and atraumatic.      Right Ear: External ear normal.      Left Ear: External ear normal.      Nose: Nose normal.      Mouth/Throat:     Eyes:      Conjunctiva/sclera: Conjunctivae normal.      Pupils: Pupils are equal, round, and reactive to light.   Cardiovascular:      Rate and Rhythm: Normal rate and regular rhythm.      Heart sounds: Normal heart sounds. No murmur heard.  Pulmonary:      Effort: Pulmonary effort is normal. No respiratory distress.      Breath sounds: Normal breath sounds. No stridor. No wheezing.   Abdominal:      General: Bowel sounds are normal. There is no distension.      Palpations: Abdomen is soft. There is no mass.      Tenderness: There is no abdominal tenderness. There is no guarding.   Musculoskeletal:         General: No tenderness or deformity. Normal range of motion.      Cervical back:  Neck supple.   Skin:     General: Skin is warm and dry.      Capillary Refill: Capillary refill takes less than 2 seconds.      Coloration: Skin is not pale.      Findings: No erythema or rash.   Neurological:      Mental Status: He is alert and oriented to person, place, and time.      Cranial Nerves: No cranial nerve deficit.      Sensory: No sensory deficit.      Coordination: Coordination normal.   Psychiatric:         Speech: Speech normal.         Behavior: Behavior is cooperative.         Results Reviewed       None            No orders to display       Procedures    ED Medication and Procedure Management   Prior to Admission Medications   Prescriptions Last Dose Informant Patient Reported? Taking?   FLUoxetine (PROzac) 20 mg capsule   Yes No   Patient not taking: Reported on 2024   albuterol (ProAir HFA) 90 mcg/act inhaler  Self No No   Sig: Inhale 2 puffs every 4 (four) hours as needed for wheezing or shortness of breath   albuterol (ProAir HFA) 90 mcg/act inhaler   No No   Sig: Inhale 2 puffs every 6 (six) hours as needed for wheezing   fluticasone (FLONASE) 50 mcg/act nasal spray   No No   Si spray into each nostril daily   methylPREDNISolone 4 MG tablet therapy pack   No No   Sig: Use as directed on package   Patient not taking: Reported on 2024   naproxen (Naprosyn) 500 mg tablet  Self No No   Sig: Take 1 tablet (500 mg total) by mouth 2 (two) times a day with meals for 10 days   Patient taking differently: Take 1 tablet (500 mg total) by mouth 2 (two) times a day with meals for 10 days      Facility-Administered Medications: None     Discharge Medication List as of 3/28/2025  4:51 AM        START taking these medications    Details   amoxicillin-clavulanate (AUGMENTIN) 875-125 mg per tablet Take 1 tablet by mouth every 12 (twelve) hours for 5 days, Starting Fri 3/28/2025, Until 2025, Normal           CONTINUE these medications which have NOT CHANGED    Details   !! albuterol  (ProAir HFA) 90 mcg/act inhaler Inhale 2 puffs every 4 (four) hours as needed for wheezing or shortness of breath, Starting Tue 3/16/2021, Normal      !! albuterol (ProAir HFA) 90 mcg/act inhaler Inhale 2 puffs every 6 (six) hours as needed for wheezing, Starting Fri 11/22/2024, Normal      FLUoxetine (PROzac) 20 mg capsule Starting Wed 2/22/2023, Historical Med      fluticasone (FLONASE) 50 mcg/act nasal spray 1 spray into each nostril daily, Starting Wed 4/19/2023, Normal      methylPREDNISolone 4 MG tablet therapy pack Use as directed on package, Normal      naproxen (Naprosyn) 500 mg tablet Take 1 tablet (500 mg total) by mouth 2 (two) times a day with meals for 10 days, Starting Mon 9/18/2023, Until Fri 11/22/2024, Normal       !! - Potential duplicate medications found. Please discuss with provider.        No discharge procedures on file.  ED SEPSIS DOCUMENTATION   Time reflects when diagnosis was documented in both MDM as applicable and the Disposition within this note       Time User Action Codes Description Comment    3/28/2025  4:49 AM Violet Betancourt Add [R22.0] Lip swelling                  Violet Betancourt MD  03/28/25 0519

## 2025-03-28 NOTE — Clinical Note
Quique Kim was seen and treated in our emergency department on 3/28/2025.                Diagnosis:     Quique  may return to work on return date.    He may return on this date: 03/31/2025         If you have any questions or concerns, please don't hesitate to call.      Queta Louis RN    ______________________________           _______________          _______________  Hospital Representative                              Date                                Time